# Patient Record
Sex: MALE | Race: WHITE | URBAN - METROPOLITAN AREA
[De-identification: names, ages, dates, MRNs, and addresses within clinical notes are randomized per-mention and may not be internally consistent; named-entity substitution may affect disease eponyms.]

---

## 2019-07-28 ENCOUNTER — OFFICE VISIT (OUTPATIENT)
Dept: URGENT CARE | Facility: URGENT CARE | Age: 24
End: 2019-07-28
Payer: COMMERCIAL

## 2019-07-28 VITALS
HEART RATE: 89 BPM | RESPIRATION RATE: 16 BRPM | DIASTOLIC BLOOD PRESSURE: 81 MMHG | SYSTOLIC BLOOD PRESSURE: 130 MMHG | BODY MASS INDEX: 22.14 KG/M2 | WEIGHT: 163.5 LBS | HEIGHT: 72 IN | TEMPERATURE: 97.5 F | OXYGEN SATURATION: 98 %

## 2019-07-28 DIAGNOSIS — K52.9 GASTROENTERITIS: Primary | ICD-10-CM

## 2019-07-28 PROCEDURE — 99203 OFFICE O/P NEW LOW 30 MIN: CPT | Performed by: FAMILY MEDICINE

## 2019-07-28 ASSESSMENT — MIFFLIN-ST. JEOR: SCORE: 1761.69

## 2019-07-28 NOTE — PROGRESS NOTES
SUBJECTIVE:  Chief Complaint   Patient presents with     Letter for School/Work     Headache     x 2 days, had nausea too. Is now feeling better. Missed work Fri and Sat.     Rolando Nayak is a 23 year old male whose symptoms began 2 days ago and include cramping, chills, headache and nausea,   No vomiting or diarrhea.   Patient denies URI symptoms and cough  Symptoms are sudden onset and resolved now and moderate.      Associated symptoms:  Pain:  Mild diffuse, generalized crampy abdominal pain  Fever: no noted fevers  Diarrhea:  none    Stools: formed last 2 days ago,soft  Appetite: decreased  Vomitin times    Risk factors: possible bad food exposure  Patient denies sick contacts, travel , recent antibiotic use, recent hospitalization, recent medication changes and hx of IBS    He missed 2 days of work, but symptoms now completely resolved-  Needs note to return to work    Past Medical History:   Diagnosis Date     Anhedonia 2014     CARDIOVASCULAR SCREENING; LDL GOAL LESS THAN 160 2012     Patient Active Problem List   Diagnosis     CARDIOVASCULAR SCREENING; LDL GOAL LESS THAN 160     Mild major depression (H)     Anhedonia       ALLERGIES:  Patient has no known allergies.      Current Outpatient Medications on File Prior to Visit:  ibuprofen 200 MG capsule Take 200 mg by mouth every 4 hours as needed.     No current facility-administered medications on file prior to visit.     Social History     Tobacco Use     Smoking status: Never Smoker     Smokeless tobacco: Never Used   Substance Use Topics     Alcohol use: No       Family History   Problem Relation Age of Onset     Hypertension Mother      Hypertension Father      Asthma Maternal Grandmother      Arthritis Maternal Grandmother      Gastrointestinal Disease Maternal Grandmother         colitis     Hypertension Maternal Grandmother      Eye Disorder Maternal Grandmother         glaucoma     Cancer - colorectal Maternal Grandfather 50      "Hypertension Paternal Grandmother      Arthritis Paternal Grandmother      Cancer Paternal Grandmother         skin cancer     Diabetes Paternal Grandfather      Hypertension Paternal Grandfather          ROS:  CONSTITUTIONAL:NEGATIVE for fever, chills,   INTEGUMENTARY/SKIN: NEGATIVE for worrisome rashes,  or lesions  EYES: NEGATIVE for vision changes or irritation  RESP:NEGATIVE for significant cough or SOB       OBJECTIVE:  /81 (BP Location: Right arm, Patient Position: Sitting, Cuff Size: Adult Regular)   Pulse 89   Temp 97.5  F (36.4  C) (Tympanic)   Resp 16   Ht 1.816 m (5' 11.5\")   Wt 74.2 kg (163 lb 8 oz)   SpO2 98%   BMI 22.49 kg/m      GENERAL APPEARANCE:  , alert and no distress  EYES: EOMI,  PERRL, conjunctiva clear  HENT: ear canals and TM's normal.  Nose and mouth without ulcers, erythema or lesions  NECK: supple, nontender, no lymphadenopathy  RESP: lungs clear to auscultation - no rales, rhonchi or wheezes  CV: regular rates and rhythm, normal S1 S2, no murmur noted  ABDOMEN:  soft, no tenderness, no HSM or masses and bowel sounds normal  Extremities:  Motor, sensation intact,  Normal ROM  NEURO: Normal strength and tone, sensory exam grossly normal,  normal speech and mentation  SKIN: no suspicious lesions or rashes    ASSESSMENT:  Gastroenteritis     Now resolved  Follow-up with PCP if persisting concerns      Note for work   "

## 2019-07-28 NOTE — LETTER
[facility]  [address]  [facility] [address]  [phone]      July 28, 2019    RE:  Rolando Nayak                                                                                                                                                       [address] [facility] [address]            To whom it may concern:    Rolando Nayak is under my professional care for Gastroenteritis.  His symptoms are completely resolved now.  He  may return to work with the following: No restrictions on or about 7/28/2019.          Sincerely,        Saundra Smith MD    [facility]